# Patient Record
Sex: MALE | Race: BLACK OR AFRICAN AMERICAN | NOT HISPANIC OR LATINO | URBAN - METROPOLITAN AREA
[De-identification: names, ages, dates, MRNs, and addresses within clinical notes are randomized per-mention and may not be internally consistent; named-entity substitution may affect disease eponyms.]

---

## 2017-03-09 ENCOUNTER — EMERGENCY (EMERGENCY)
Facility: HOSPITAL | Age: 72
LOS: 1 days | Discharge: HOME | End: 2017-03-09
Attending: EMERGENCY MEDICINE | Admitting: EMERGENCY MEDICINE
Payer: MEDICARE

## 2017-03-09 VITALS
TEMPERATURE: 98 F | RESPIRATION RATE: 16 BRPM | HEART RATE: 70 BPM | SYSTOLIC BLOOD PRESSURE: 132 MMHG | DIASTOLIC BLOOD PRESSURE: 73 MMHG | OXYGEN SATURATION: 100 %

## 2017-03-09 LAB
ALBUMIN SERPL ELPH-MCNC: 3.7 G/DL — SIGNIFICANT CHANGE UP (ref 3.3–5)
ALP SERPL-CCNC: 135 U/L — HIGH (ref 40–120)
ALT FLD-CCNC: 11 U/L — SIGNIFICANT CHANGE UP (ref 4–41)
AST SERPL-CCNC: 15 U/L — SIGNIFICANT CHANGE UP (ref 4–40)
BASE EXCESS BLDV CALC-SCNC: 7 MMOL/L — SIGNIFICANT CHANGE UP
BASOPHILS # BLD AUTO: 0.02 K/UL — SIGNIFICANT CHANGE UP (ref 0–0.2)
BASOPHILS NFR BLD AUTO: 0.4 % — SIGNIFICANT CHANGE UP (ref 0–2)
BILIRUB SERPL-MCNC: 0.9 MG/DL — SIGNIFICANT CHANGE UP (ref 0.2–1.2)
BLOOD GAS VENOUS - CREATININE: 1.64 MG/DL — HIGH (ref 0.5–1.3)
BUN SERPL-MCNC: 21 MG/DL — SIGNIFICANT CHANGE UP (ref 7–23)
CALCIUM SERPL-MCNC: 9.4 MG/DL — SIGNIFICANT CHANGE UP (ref 8.4–10.5)
CHLORIDE BLDV-SCNC: 98 MMOL/L — SIGNIFICANT CHANGE UP (ref 96–108)
CHLORIDE SERPL-SCNC: 98 MMOL/L — SIGNIFICANT CHANGE UP (ref 98–107)
CK MB BLD-MCNC: 2.53 NG/ML — SIGNIFICANT CHANGE UP (ref 1–6.6)
CK MB BLD-MCNC: 2.61 NG/ML — SIGNIFICANT CHANGE UP (ref 1–6.6)
CK SERPL-CCNC: 105 U/L — SIGNIFICANT CHANGE UP (ref 30–200)
CK SERPL-CCNC: 124 U/L — SIGNIFICANT CHANGE UP (ref 30–200)
CO2 SERPL-SCNC: 28 MMOL/L — SIGNIFICANT CHANGE UP (ref 22–31)
CREAT SERPL-MCNC: 1.63 MG/DL — HIGH (ref 0.5–1.3)
EOSINOPHIL # BLD AUTO: 0.06 K/UL — SIGNIFICANT CHANGE UP (ref 0–0.5)
EOSINOPHIL NFR BLD AUTO: 1.3 % — SIGNIFICANT CHANGE UP (ref 0–6)
GAS PNL BLDV: 136 MMOL/L — SIGNIFICANT CHANGE UP (ref 136–146)
GLUCOSE BLDV-MCNC: 235 — HIGH (ref 70–99)
GLUCOSE SERPL-MCNC: 245 MG/DL — HIGH (ref 70–99)
HBA1C BLD-MCNC: 10 % — HIGH (ref 4–5.6)
HCO3 BLDV-SCNC: 28 MMOL/L — HIGH (ref 20–27)
HCT VFR BLD CALC: 38.9 % — LOW (ref 39–50)
HCT VFR BLDV CALC: 40.7 % — SIGNIFICANT CHANGE UP (ref 39–51)
HGB BLD-MCNC: 12.9 G/DL — LOW (ref 13–17)
HGB BLDV-MCNC: 13.3 G/DL — SIGNIFICANT CHANGE UP (ref 13–17)
IMM GRANULOCYTES NFR BLD AUTO: 0.2 % — SIGNIFICANT CHANGE UP (ref 0–1.5)
LACTATE BLDV-MCNC: 1.5 MMOL/L — SIGNIFICANT CHANGE UP (ref 0.5–2)
LYMPHOCYTES # BLD AUTO: 0.95 K/UL — LOW (ref 1–3.3)
LYMPHOCYTES # BLD AUTO: 20.8 % — SIGNIFICANT CHANGE UP (ref 13–44)
MCHC RBC-ENTMCNC: 29.4 PG — SIGNIFICANT CHANGE UP (ref 27–34)
MCHC RBC-ENTMCNC: 33.2 % — SIGNIFICANT CHANGE UP (ref 32–36)
MCV RBC AUTO: 88.6 FL — SIGNIFICANT CHANGE UP (ref 80–100)
MONOCYTES # BLD AUTO: 0.39 K/UL — SIGNIFICANT CHANGE UP (ref 0–0.9)
MONOCYTES NFR BLD AUTO: 8.5 % — SIGNIFICANT CHANGE UP (ref 2–14)
NEUTROPHILS # BLD AUTO: 3.14 K/UL — SIGNIFICANT CHANGE UP (ref 1.8–7.4)
NEUTROPHILS NFR BLD AUTO: 68.8 % — SIGNIFICANT CHANGE UP (ref 43–77)
PCO2 BLDV: 58 MMHG — HIGH (ref 41–51)
PH BLDV: 7.36 PH — SIGNIFICANT CHANGE UP (ref 7.32–7.43)
PLATELET # BLD AUTO: 174 K/UL — SIGNIFICANT CHANGE UP (ref 150–400)
PMV BLD: 10.2 FL — SIGNIFICANT CHANGE UP (ref 7–13)
PO2 BLDV: 25 MMHG — LOW (ref 35–40)
POTASSIUM BLDV-SCNC: 4.5 MMOL/L — SIGNIFICANT CHANGE UP (ref 3.4–4.5)
POTASSIUM SERPL-MCNC: 4.5 MMOL/L — SIGNIFICANT CHANGE UP (ref 3.5–5.3)
POTASSIUM SERPL-SCNC: 4.5 MMOL/L — SIGNIFICANT CHANGE UP (ref 3.5–5.3)
PROT SERPL-MCNC: 6.8 G/DL — SIGNIFICANT CHANGE UP (ref 6–8.3)
RBC # BLD: 4.39 M/UL — SIGNIFICANT CHANGE UP (ref 4.2–5.8)
RBC # FLD: 12.6 % — SIGNIFICANT CHANGE UP (ref 10.3–14.5)
SAO2 % BLDV: 35.6 % — LOW (ref 60–85)
SODIUM SERPL-SCNC: 139 MMOL/L — SIGNIFICANT CHANGE UP (ref 135–145)
TROPONIN T SERPL-MCNC: < 0.06 NG/ML — SIGNIFICANT CHANGE UP (ref 0–0.06)
TROPONIN T SERPL-MCNC: < 0.06 NG/ML — SIGNIFICANT CHANGE UP (ref 0–0.06)
WBC # BLD: 4.57 K/UL — SIGNIFICANT CHANGE UP (ref 3.8–10.5)
WBC # FLD AUTO: 4.57 K/UL — SIGNIFICANT CHANGE UP (ref 3.8–10.5)

## 2017-03-09 PROCEDURE — 71020: CPT | Mod: 26

## 2017-03-09 PROCEDURE — 99218: CPT | Mod: GC

## 2017-03-09 RX ORDER — DEXTROSE 50 % IN WATER 50 %
25 SYRINGE (ML) INTRAVENOUS ONCE
Qty: 0 | Refills: 0 | Status: DISCONTINUED | OUTPATIENT
Start: 2017-03-09 | End: 2017-03-09

## 2017-03-09 RX ORDER — GLUCAGON INJECTION, SOLUTION 0.5 MG/.1ML
1 INJECTION, SOLUTION SUBCUTANEOUS ONCE
Qty: 0 | Refills: 0 | Status: DISCONTINUED | OUTPATIENT
Start: 2017-03-09 | End: 2017-03-13

## 2017-03-09 RX ORDER — DEXTROSE 50 % IN WATER 50 %
12.5 SYRINGE (ML) INTRAVENOUS ONCE
Qty: 0 | Refills: 0 | Status: DISCONTINUED | OUTPATIENT
Start: 2017-03-09 | End: 2017-03-09

## 2017-03-09 RX ORDER — GLUCAGON INJECTION, SOLUTION 0.5 MG/.1ML
1 INJECTION, SOLUTION SUBCUTANEOUS ONCE
Qty: 0 | Refills: 0 | Status: DISCONTINUED | OUTPATIENT
Start: 2017-03-09 | End: 2017-03-09

## 2017-03-09 RX ORDER — DEXTROSE 50 % IN WATER 50 %
25 SYRINGE (ML) INTRAVENOUS ONCE
Qty: 0 | Refills: 0 | Status: DISCONTINUED | OUTPATIENT
Start: 2017-03-09 | End: 2017-03-13

## 2017-03-09 RX ORDER — CARVEDILOL PHOSPHATE 80 MG/1
25 CAPSULE, EXTENDED RELEASE ORAL EVERY 12 HOURS
Qty: 0 | Refills: 0 | Status: DISCONTINUED | OUTPATIENT
Start: 2017-03-09 | End: 2017-03-13

## 2017-03-09 RX ORDER — DEXTROSE 50 % IN WATER 50 %
1 SYRINGE (ML) INTRAVENOUS ONCE
Qty: 0 | Refills: 0 | Status: DISCONTINUED | OUTPATIENT
Start: 2017-03-09 | End: 2017-03-09

## 2017-03-09 RX ORDER — SODIUM CHLORIDE 9 MG/ML
1000 INJECTION, SOLUTION INTRAVENOUS
Qty: 0 | Refills: 0 | Status: DISCONTINUED | OUTPATIENT
Start: 2017-03-09 | End: 2017-03-09

## 2017-03-09 RX ORDER — INSULIN LISPRO 100/ML
VIAL (ML) SUBCUTANEOUS
Qty: 0 | Refills: 0 | Status: DISCONTINUED | OUTPATIENT
Start: 2017-03-09 | End: 2017-03-13

## 2017-03-09 RX ORDER — TAMSULOSIN HYDROCHLORIDE 0.4 MG/1
0.4 CAPSULE ORAL AT BEDTIME
Qty: 0 | Refills: 0 | Status: DISCONTINUED | OUTPATIENT
Start: 2017-03-09 | End: 2017-03-13

## 2017-03-09 RX ORDER — INSULIN GLARGINE 100 [IU]/ML
10 INJECTION, SOLUTION SUBCUTANEOUS AT BEDTIME
Qty: 0 | Refills: 0 | Status: DISCONTINUED | OUTPATIENT
Start: 2017-03-09 | End: 2017-03-13

## 2017-03-09 RX ORDER — SODIUM CHLORIDE 9 MG/ML
1000 INJECTION INTRAMUSCULAR; INTRAVENOUS; SUBCUTANEOUS ONCE
Qty: 0 | Refills: 0 | Status: COMPLETED | OUTPATIENT
Start: 2017-03-09 | End: 2017-03-09

## 2017-03-09 RX ORDER — SODIUM CHLORIDE 9 MG/ML
1000 INJECTION, SOLUTION INTRAVENOUS
Qty: 0 | Refills: 0 | Status: DISCONTINUED | OUTPATIENT
Start: 2017-03-09 | End: 2017-03-13

## 2017-03-09 RX ORDER — INSULIN LISPRO 100/ML
VIAL (ML) SUBCUTANEOUS
Qty: 0 | Refills: 0 | Status: DISCONTINUED | OUTPATIENT
Start: 2017-03-09 | End: 2017-03-09

## 2017-03-09 RX ORDER — INSULIN LISPRO 100/ML
3 VIAL (ML) SUBCUTANEOUS
Qty: 0 | Refills: 0 | Status: DISCONTINUED | OUTPATIENT
Start: 2017-03-09 | End: 2017-03-13

## 2017-03-09 RX ORDER — DEXTROSE 50 % IN WATER 50 %
1 SYRINGE (ML) INTRAVENOUS ONCE
Qty: 0 | Refills: 0 | Status: DISCONTINUED | OUTPATIENT
Start: 2017-03-09 | End: 2017-03-13

## 2017-03-09 RX ORDER — DEXTROSE 50 % IN WATER 50 %
12.5 SYRINGE (ML) INTRAVENOUS ONCE
Qty: 0 | Refills: 0 | Status: DISCONTINUED | OUTPATIENT
Start: 2017-03-09 | End: 2017-03-13

## 2017-03-09 RX ADMIN — SODIUM CHLORIDE 1000 MILLILITER(S): 9 INJECTION INTRAMUSCULAR; INTRAVENOUS; SUBCUTANEOUS at 12:46

## 2017-03-09 RX ADMIN — Medication 1: at 18:34

## 2017-03-09 RX ADMIN — TAMSULOSIN HYDROCHLORIDE 0.4 MILLIGRAM(S): 0.4 CAPSULE ORAL at 21:20

## 2017-03-09 RX ADMIN — CARVEDILOL PHOSPHATE 25 MILLIGRAM(S): 80 CAPSULE, EXTENDED RELEASE ORAL at 19:00

## 2017-03-09 NOTE — ED CDU PROVIDER NOTE - DETAILS
71 y.o male pmhx DM, HTN, BPH w/ L sided intermittent chest pain. Cardiac enzymes, stress test, cardiac monitor.

## 2017-03-09 NOTE — ED ADULT NURSE NOTE - OBJECTIVE STATEMENT
Received patient to room 11 with c/o left shoulder and chest pain that he has had for 3 weeks but worse since last night and not relieved with movement. Patient evaluated by MD. IVL placed to left AC 20 gauge and labs drawn and sent, Patient placed on cardiac monitor and is NSR. Pt does c/o 6/10 chest and shoulder pain..more the shoulder than chest. EKG done. Waiting for results, further testing, evaluation and disposition. Will continue to monitor.    KWADWO Macias

## 2017-03-09 NOTE — ED CDU PROVIDER NOTE - PROGRESS NOTE DETAILS
YVONNE Ballard: Left message with  at Dr. Kaba office for call back. Pt comfortable no complaints at the moment. YVONNE Ballard: Pts a1c 10. will call endo consult. YVONNE Maynard: Pt reassessed at bedside, NAD, VSS, NSR on tele, CEx2 negative, pending stress test in the AM. Spoke to endo fellow Dr. Patel, recommends Humalog 3U TID w/meals, Lantus 10U qhs, and low dose sliding scale. Pt refusing Lantus dose. Told him that lantus is slow releasing and should not cause hypoglycemia, however pt still does not want lantus. Will continue to monitor. YVONNE Maynard: Pt started c/o left sided chest pain, no shortness of breath, no radiation of cp. Repeat EKG done, unchanged. Cardiac enzymes resent. Will give Morphine 4mg now. Dr. Nguyen: I performed a face to face bedside interview with patient regarding history of present illness, review of symptoms and past medical history. I completed an independent physical exam.  I have discussed patient's plan of care with PA.   I agree with note as stated above, having amended the EMR as needed to reflect my findings.   This includes HISTORY OF PRESENT ILLNESS, HIV, PAST MEDICAL/SURGICAL/FAMILY/SOCIAL HISTORY, ALLERGIES AND HOME MEDICATIONS, REVIEW OF SYSTEMS, PHYSICAL EXAM, and any PROGRESS NOTES during the time I functioned as the attending physician for this patient. Dr. Nguyen: Pt in stress test. Dr. Nguyen: Pt returned from stress, doing well, asx, eating breakfast. Awaiting stress results and endocrine. PA Gerasimou - Pt and vitals stable. Pt doing well in the morning, denies CP, SOB, palpitations, abdominal pain. Pt admits to an "upset stomach", after having breakfast. Denies any pain, +bloating. Exam: +BSx4 quadrants. Soft, non-tender. No rebound tenderness or guarding. Plan: Pepcid, continue to reassess. YVONNE Carter - Spoke with merissa Little. Pt states he would like to change his diet and continue his insulin regime at home, will f/u with PCP for repeat A1c. Merissa recommends to continue home regime Humilin 70/30, 20 units before breakfast and 6 units before dinner with vial. YVONNE Carter - Spoke with merissa Little. Pt states he would like to change his diet and continue his insulin regime at home, will f/u with PCP for repeat A1c. Merissa recommends to continue home regime Humilin 70/30, 20 units before breakfast and 6 units before dinner with vial. Pt stable, denies chest pain, SOB or any comaplints. Dr. Nguyen is amenable for pt discharge home. Dr. Nguyen: Seen by poppy, recommendations give, pt stable for dc.

## 2017-03-09 NOTE — ED PROVIDER NOTE - OBJECTIVE STATEMENT
71M h/o DM,HTN, BPH p/w left sided chest pain.  Pain began last night, was severe, sternal radiating to the left shoulder.  Pain is sharp, constant, unable to sleep 2/2 pain.  Unknown if it is exertional as he did not walk much 2/2 pain.  Denies SOB, abd pain, n/v/d/diaphoresis.  Denies cough or fever. 71M h/o DM,HTN, BPH p/w left sided chest pain.  Pain began last night, was severe, sternal radiating to the left shoulder.  Pain is sharp, constant, unable to sleep 2/2 pain.  Unknown if it is exertional as he did not walk much 2/2 pain.  Denies SOB, abd pain, n/v/d/diaphoresis.  Denies cough or fever.  Pt given ASA 162mg by EMS 71M h/o DM,HTN, BPH p/w left sided chest pain.  Pain began last night, was severe, sternal radiating to the left shoulder.  Pain is sharp, constant, unable to sleep 2/2 pain.  Unknown if it is exertional as he did not walk much 2/2 pain.  Denies SOB, abd pain, n/v/d/diaphoresis.  Denies cough or fever.  Pt given ASA 162mg by EMS    Attending/Stella: 70 yo M as described above, p/w chest pain. Pt reports around midnight last night of a sudden onset of left-sided chest pain described as pressure-like, initially 8/10, non-radiating, associated w/ SALEEM. Maninder n/v, diaphoresis, or palp. Pt also notes increase of chest pain w/ exertion.

## 2017-03-09 NOTE — ED PROVIDER NOTE - PROGRESS NOTE DETAILS
no acute findings on initial testing, given age and comorbidities will send to obs for further testing

## 2017-03-09 NOTE — ED ADULT TRIAGE NOTE - CHIEF COMPLAINT QUOTE
Pt c/o intermittent chest pressure radiating L shldr  x 3 weeks , worse last night.  Denies sob, dizziness, cough.  Asp 2 tabs given by ems

## 2017-03-09 NOTE — ED CDU PROVIDER NOTE - PLAN OF CARE
Follow up with cardiology, referral list given. You may take Aspirin over the counter 81mg until you are evaluated by cardiology. Follow up with your primary care physician regarding your Diabetes for repeat A1c testing. Return to ER for any chest pain, shortness of breathe, palpitations or any other concerns.

## 2017-03-09 NOTE — ED PROVIDER NOTE - PHYSICAL EXAMINATION
Attending/Stella: NAD, PERRL/EOMI, supple, no carotid bruit, RRR, Abd-sfot, NT/ND, no LE edema, +2 Dp/PT, A&ox3, nonfocal; Skin-warm/dry

## 2017-03-10 VITALS
DIASTOLIC BLOOD PRESSURE: 86 MMHG | TEMPERATURE: 98 F | SYSTOLIC BLOOD PRESSURE: 172 MMHG | HEART RATE: 57 BPM | RESPIRATION RATE: 16 BRPM | OXYGEN SATURATION: 100 %

## 2017-03-10 LAB
CK MB BLD-MCNC: 2.98 NG/ML — SIGNIFICANT CHANGE UP (ref 1–6.6)
CK MB BLD-MCNC: SIGNIFICANT CHANGE UP (ref 0–2.5)
CK SERPL-CCNC: 120 U/L — SIGNIFICANT CHANGE UP (ref 30–200)
TROPONIN T SERPL-MCNC: < 0.06 NG/ML — SIGNIFICANT CHANGE UP (ref 0–0.06)

## 2017-03-10 PROCEDURE — 99217: CPT | Mod: GC

## 2017-03-10 PROCEDURE — 93018 CV STRESS TEST I&R ONLY: CPT | Mod: GC

## 2017-03-10 PROCEDURE — 78452 HT MUSCLE IMAGE SPECT MULT: CPT | Mod: 26

## 2017-03-10 PROCEDURE — 99284 EMERGENCY DEPT VISIT MOD MDM: CPT

## 2017-03-10 PROCEDURE — 93016 CV STRESS TEST SUPVJ ONLY: CPT | Mod: GC

## 2017-03-10 RX ORDER — FAMOTIDINE 10 MG/ML
20 INJECTION INTRAVENOUS ONCE
Qty: 0 | Refills: 0 | Status: COMPLETED | OUTPATIENT
Start: 2017-03-10 | End: 2017-03-10

## 2017-03-10 RX ORDER — MORPHINE SULFATE 50 MG/1
4 CAPSULE, EXTENDED RELEASE ORAL ONCE
Qty: 0 | Refills: 0 | Status: DISCONTINUED | OUTPATIENT
Start: 2017-03-10 | End: 2017-03-10

## 2017-03-10 RX ADMIN — Medication: at 10:25

## 2017-03-10 RX ADMIN — FAMOTIDINE 20 MILLIGRAM(S): 10 INJECTION INTRAVENOUS at 13:19

## 2017-03-10 RX ADMIN — MORPHINE SULFATE 4 MILLIGRAM(S): 50 CAPSULE, EXTENDED RELEASE ORAL at 07:05

## 2017-03-10 RX ADMIN — MORPHINE SULFATE 4 MILLIGRAM(S): 50 CAPSULE, EXTENDED RELEASE ORAL at 06:48

## 2017-03-10 RX ADMIN — Medication 3 UNIT(S): at 15:28

## 2017-03-10 RX ADMIN — Medication 3 UNIT(S): at 10:25

## 2017-03-10 RX ADMIN — Medication 2: at 15:28

## 2017-04-19 ENCOUNTER — EMERGENCY (EMERGENCY)
Facility: HOSPITAL | Age: 72
LOS: 1 days | Discharge: HOME | End: 2017-04-19
Attending: EMERGENCY MEDICINE | Admitting: EMERGENCY MEDICINE
Payer: MEDICARE

## 2017-04-19 VITALS
TEMPERATURE: 99 F | OXYGEN SATURATION: 100 % | SYSTOLIC BLOOD PRESSURE: 135 MMHG | HEART RATE: 75 BPM | RESPIRATION RATE: 18 BRPM | DIASTOLIC BLOOD PRESSURE: 89 MMHG

## 2017-04-19 PROCEDURE — 99284 EMERGENCY DEPT VISIT MOD MDM: CPT | Mod: 25

## 2017-04-19 NOTE — ED ADULT TRIAGE NOTE - CHIEF COMPLAINT QUOTE
Pt c/o abdominal pain x1 month. Pt c/o burning sensation in throat. Pt states he was prescribed omeprazole from his PMD but no relief. Denies n/v/d.

## 2017-04-20 VITALS
DIASTOLIC BLOOD PRESSURE: 91 MMHG | HEART RATE: 64 BPM | SYSTOLIC BLOOD PRESSURE: 198 MMHG | OXYGEN SATURATION: 100 % | RESPIRATION RATE: 16 BRPM

## 2017-04-20 LAB
ALBUMIN SERPL ELPH-MCNC: 4.2 G/DL — SIGNIFICANT CHANGE UP (ref 3.3–5)
ALP SERPL-CCNC: 101 U/L — SIGNIFICANT CHANGE UP (ref 40–120)
ALT FLD-CCNC: 10 U/L — SIGNIFICANT CHANGE UP (ref 4–41)
AST SERPL-CCNC: 18 U/L — SIGNIFICANT CHANGE UP (ref 4–40)
BILIRUB SERPL-MCNC: 1 MG/DL — SIGNIFICANT CHANGE UP (ref 0.2–1.2)
BUN SERPL-MCNC: 30 MG/DL — HIGH (ref 7–23)
CALCIUM SERPL-MCNC: 9.5 MG/DL — SIGNIFICANT CHANGE UP (ref 8.4–10.5)
CHLORIDE SERPL-SCNC: 95 MMOL/L — LOW (ref 98–107)
CO2 SERPL-SCNC: 27 MMOL/L — SIGNIFICANT CHANGE UP (ref 22–31)
CREAT SERPL-MCNC: 1.82 MG/DL — HIGH (ref 0.5–1.3)
GLUCOSE SERPL-MCNC: 233 MG/DL — HIGH (ref 70–99)
HCT VFR BLD CALC: 39.2 % — SIGNIFICANT CHANGE UP (ref 39–50)
HGB BLD-MCNC: 12.9 G/DL — LOW (ref 13–17)
LIDOCAIN IGE QN: 22.9 U/L — SIGNIFICANT CHANGE UP (ref 7–60)
MCHC RBC-ENTMCNC: 29.5 PG — SIGNIFICANT CHANGE UP (ref 27–34)
MCHC RBC-ENTMCNC: 32.9 % — SIGNIFICANT CHANGE UP (ref 32–36)
MCV RBC AUTO: 89.5 FL — SIGNIFICANT CHANGE UP (ref 80–100)
PLATELET # BLD AUTO: 187 K/UL — SIGNIFICANT CHANGE UP (ref 150–400)
PMV BLD: 10.3 FL — SIGNIFICANT CHANGE UP (ref 7–13)
POTASSIUM SERPL-MCNC: 4.2 MMOL/L — SIGNIFICANT CHANGE UP (ref 3.5–5.3)
POTASSIUM SERPL-SCNC: 4.2 MMOL/L — SIGNIFICANT CHANGE UP (ref 3.5–5.3)
PROT SERPL-MCNC: 7.2 G/DL — SIGNIFICANT CHANGE UP (ref 6–8.3)
RBC # BLD: 4.38 M/UL — SIGNIFICANT CHANGE UP (ref 4.2–5.8)
RBC # FLD: 12.8 % — SIGNIFICANT CHANGE UP (ref 10.3–14.5)
SODIUM SERPL-SCNC: 135 MMOL/L — SIGNIFICANT CHANGE UP (ref 135–145)
WBC # BLD: 3.85 K/UL — SIGNIFICANT CHANGE UP (ref 3.8–10.5)
WBC # FLD AUTO: 3.85 K/UL — SIGNIFICANT CHANGE UP (ref 3.8–10.5)

## 2017-04-20 RX ORDER — SODIUM CHLORIDE 9 MG/ML
1000 INJECTION INTRAMUSCULAR; INTRAVENOUS; SUBCUTANEOUS ONCE
Qty: 0 | Refills: 0 | Status: COMPLETED | OUTPATIENT
Start: 2017-04-20 | End: 2017-04-20

## 2017-04-20 RX ORDER — FAMOTIDINE 10 MG/ML
20 INJECTION INTRAVENOUS ONCE
Qty: 0 | Refills: 0 | Status: COMPLETED | OUTPATIENT
Start: 2017-04-20 | End: 2017-04-20

## 2017-04-20 RX ADMIN — Medication 30 MILLILITER(S): at 00:36

## 2017-04-20 RX ADMIN — FAMOTIDINE 20 MILLIGRAM(S): 10 INJECTION INTRAVENOUS at 00:36

## 2017-04-20 RX ADMIN — SODIUM CHLORIDE 1000 MILLILITER(S): 9 INJECTION INTRAMUSCULAR; INTRAVENOUS; SUBCUTANEOUS at 00:36

## 2017-04-20 NOTE — ED PROVIDER NOTE - MEDICAL DECISION MAKING DETAILS
non focal exam; well appearing; requesting endoscopy; informed will give GI follow up where he can get endoscopy; symptoms most consistent with GERD

## 2017-04-20 NOTE — ED ADULT NURSE NOTE - OBJECTIVE STATEMENT
Pt received on stretcher in room 10. Pt is awake, ambulatory, A&Ox4, NAD observed, respirations even and unlabored on room air. Pt is 73 YO male with h/o HTN, DM c/o abd pain x 1 month. Pain is in epigastric area, rated 4/10 on pain scale, does not radiate, positional relief. Abdomen appears nondistended, nontender on palpation, BS present in all four abd quadrants. Last BM this morning. Denies HA, CP, SOB, nausea, vomiting, diarrhea, urinary symptoms. VS recorded. 20G IV access obtained in right AC, labs drawn and sent as ordered. Medications administered as ordered. Comfort and safety measures provided. Call bell within reach.

## 2017-04-20 NOTE — ED PROVIDER NOTE - PROGRESS NOTE DETAILS
feels better; normal abdominal exam; pt does not endorse pain currently; EKG wnl; had cardiac workup on prior CDU stay which was wnl.  Will d/c home with GI follow up

## 2017-04-20 NOTE — ED PROVIDER NOTE - OBJECTIVE STATEMENT
72 yr old male with PMH of HTN, DM presents with 1 month of mild abdominal pain.  States feels burning in epigastrum radiating to throat with belching.  Happens intermittently.  Went to see PCP and was prescribed omeprazole with minimal improvement.  Denies, fever, vomiting.  Denies CP, SOB.  No prior abdominal surgeries.  Has been having BM and passing flatus.

## 2017-04-25 ENCOUNTER — APPOINTMENT (OUTPATIENT)
Dept: GASTROENTEROLOGY | Facility: CLINIC | Age: 72
End: 2017-04-25

## 2017-04-25 VITALS
HEART RATE: 68 BPM | HEIGHT: 72 IN | SYSTOLIC BLOOD PRESSURE: 130 MMHG | DIASTOLIC BLOOD PRESSURE: 70 MMHG | TEMPERATURE: 98.6 F | WEIGHT: 174 LBS | BODY MASS INDEX: 23.57 KG/M2 | OXYGEN SATURATION: 99 %

## 2017-04-25 DIAGNOSIS — Z78.9 OTHER SPECIFIED HEALTH STATUS: ICD-10-CM

## 2017-04-25 DIAGNOSIS — F15.90 OTHER STIMULANT USE, UNSPECIFIED, UNCOMPLICATED: ICD-10-CM

## 2017-04-25 DIAGNOSIS — K31.9 DISEASE OF STOMACH AND DUODENUM, UNSPECIFIED: ICD-10-CM

## 2017-04-25 DIAGNOSIS — N40.0 BENIGN PROSTATIC HYPERPLASIA WITHOUT LOWER URINARY TRACT SYMPMS: ICD-10-CM

## 2017-04-25 DIAGNOSIS — Z82.3 FAMILY HISTORY OF STROKE: ICD-10-CM

## 2017-04-25 RX ORDER — INSULIN HUMAN 100 [IU]/ML
(70-30) 100 INJECTION, SUSPENSION SUBCUTANEOUS
Qty: 30 | Refills: 0 | Status: ACTIVE | COMMUNITY
Start: 2017-02-16

## 2017-04-25 RX ORDER — SYRINGE AND NEEDLE,INSULIN,1ML 30 G X1/2"
30G X 1/2" SYRINGE, EMPTY DISPOSABLE MISCELLANEOUS
Qty: 100 | Refills: 0 | Status: ACTIVE | COMMUNITY
Start: 2017-01-04

## 2017-04-25 RX ORDER — IRON FUM,AG/C/B12/FOLIC/CA/SUC 151-60-1MG
50-101-1 TABLET ORAL
Qty: 30 | Refills: 0 | Status: ACTIVE | COMMUNITY
Start: 2016-05-05

## 2017-04-25 RX ORDER — LATANOPROST/PF 0.005 %
0.01 DROPS OPHTHALMIC (EYE)
Qty: 7 | Refills: 0 | Status: ACTIVE | COMMUNITY
Start: 2016-09-26

## 2017-04-25 RX ORDER — BLOOD SUGAR DIAGNOSTIC
STRIP MISCELLANEOUS
Qty: 100 | Refills: 0 | Status: ACTIVE | COMMUNITY
Start: 2016-02-12

## 2017-04-25 RX ORDER — CARVEDILOL 25 MG/1
25 TABLET, FILM COATED ORAL
Qty: 60 | Refills: 0 | Status: ACTIVE | COMMUNITY
Start: 2016-09-29

## 2017-04-25 RX ORDER — BRIMONIDINE TARTRATE 1.5 MG/ML
0.15 SOLUTION/ DROPS OPHTHALMIC
Qty: 5 | Refills: 0 | Status: ACTIVE | COMMUNITY
Start: 2016-09-26

## 2017-04-25 RX ORDER — TAMSULOSIN HYDROCHLORIDE 0.4 MG/1
0.4 CAPSULE ORAL
Qty: 30 | Refills: 0 | Status: ACTIVE | COMMUNITY
Start: 2016-08-01

## 2017-06-28 ENCOUNTER — APPOINTMENT (OUTPATIENT)
Dept: GASTROENTEROLOGY | Facility: AMBULATORY MEDICAL SERVICES | Age: 72
End: 2017-06-28

## 2017-07-18 ENCOUNTER — LABORATORY RESULT (OUTPATIENT)
Age: 72
End: 2017-07-18

## 2017-07-18 ENCOUNTER — APPOINTMENT (OUTPATIENT)
Dept: GASTROENTEROLOGY | Facility: CLINIC | Age: 72
End: 2017-07-18

## 2017-07-18 VITALS
SYSTOLIC BLOOD PRESSURE: 110 MMHG | DIASTOLIC BLOOD PRESSURE: 70 MMHG | BODY MASS INDEX: 20.59 KG/M2 | HEIGHT: 72 IN | WEIGHT: 152 LBS | OXYGEN SATURATION: 99 % | TEMPERATURE: 98.7 F | HEART RATE: 82 BPM

## 2017-07-18 DIAGNOSIS — N28.9 DISORDER OF KIDNEY AND URETER, UNSPECIFIED: ICD-10-CM

## 2017-07-18 RX ORDER — OMEPRAZOLE 40 MG/1
40 CAPSULE, DELAYED RELEASE ORAL
Qty: 30 | Refills: 3 | Status: DISCONTINUED | COMMUNITY
Start: 2017-03-17 | End: 2017-07-18

## 2017-07-26 ENCOUNTER — APPOINTMENT (OUTPATIENT)
Dept: ULTRASOUND IMAGING | Facility: CLINIC | Age: 72
End: 2017-07-26

## 2017-08-08 ENCOUNTER — FORM ENCOUNTER (OUTPATIENT)
Age: 72
End: 2017-08-08

## 2017-08-09 ENCOUNTER — OUTPATIENT (OUTPATIENT)
Dept: OUTPATIENT SERVICES | Facility: HOSPITAL | Age: 72
LOS: 1 days | End: 2017-08-09
Payer: COMMERCIAL

## 2017-08-09 ENCOUNTER — APPOINTMENT (OUTPATIENT)
Dept: ULTRASOUND IMAGING | Facility: CLINIC | Age: 72
End: 2017-08-09
Payer: MEDICARE

## 2017-08-09 DIAGNOSIS — D18.03 HEMANGIOMA OF INTRA-ABDOMINAL STRUCTURES: ICD-10-CM

## 2017-08-09 PROCEDURE — 76700 US EXAM ABDOM COMPLETE: CPT

## 2017-08-09 PROCEDURE — 76700 US EXAM ABDOM COMPLETE: CPT | Mod: 26

## 2017-08-14 ENCOUNTER — LABORATORY RESULT (OUTPATIENT)
Age: 72
End: 2017-08-14

## 2017-08-18 ENCOUNTER — APPOINTMENT (OUTPATIENT)
Dept: GASTROENTEROLOGY | Facility: CLINIC | Age: 72
End: 2017-08-18
Payer: MEDICARE

## 2017-08-18 VITALS
BODY MASS INDEX: 20.05 KG/M2 | HEIGHT: 72 IN | SYSTOLIC BLOOD PRESSURE: 110 MMHG | HEART RATE: 82 BPM | OXYGEN SATURATION: 99 % | WEIGHT: 148 LBS | TEMPERATURE: 98 F | DIASTOLIC BLOOD PRESSURE: 70 MMHG

## 2017-08-18 DIAGNOSIS — R63.4 ABNORMAL WEIGHT LOSS: ICD-10-CM

## 2017-08-18 PROCEDURE — 99213 OFFICE O/P EST LOW 20 MIN: CPT

## 2017-08-18 RX ORDER — DICLOFENAC SODIUM 10 MG/G
1 GEL TOPICAL
Qty: 100 | Refills: 0 | Status: ACTIVE | COMMUNITY
Start: 2017-07-14

## 2017-08-18 RX ORDER — NEOMYCIN SULFATE, POLYMYXIN B SULFATE AND DEXAMETHASONE 3.5; 10000; 1 MG/ML; [USP'U]/ML; MG/ML
3.5-10000-0.1 SUSPENSION OPHTHALMIC
Qty: 5 | Refills: 0 | Status: ACTIVE | COMMUNITY
Start: 2017-07-12

## 2017-09-28 ENCOUNTER — APPOINTMENT (OUTPATIENT)
Dept: GASTROENTEROLOGY | Facility: CLINIC | Age: 72
End: 2017-09-28
Payer: MEDICARE

## 2017-09-28 VITALS
WEIGHT: 148 LBS | DIASTOLIC BLOOD PRESSURE: 86 MMHG | HEIGHT: 72 IN | SYSTOLIC BLOOD PRESSURE: 140 MMHG | HEART RATE: 73 BPM | OXYGEN SATURATION: 97 % | BODY MASS INDEX: 20.05 KG/M2 | TEMPERATURE: 98.6 F

## 2017-09-28 DIAGNOSIS — R63.0 ANOREXIA: ICD-10-CM

## 2017-09-28 PROCEDURE — 99213 OFFICE O/P EST LOW 20 MIN: CPT

## 2017-10-17 ENCOUNTER — APPOINTMENT (OUTPATIENT)
Dept: GASTROENTEROLOGY | Facility: CLINIC | Age: 72
End: 2017-10-17

## 2017-10-31 ENCOUNTER — APPOINTMENT (OUTPATIENT)
Dept: GASTROENTEROLOGY | Facility: CLINIC | Age: 72
End: 2017-10-31
Payer: MEDICARE

## 2017-10-31 VITALS
BODY MASS INDEX: 21.13 KG/M2 | TEMPERATURE: 98 F | HEART RATE: 59 BPM | DIASTOLIC BLOOD PRESSURE: 80 MMHG | WEIGHT: 156 LBS | OXYGEN SATURATION: 99 % | HEIGHT: 72 IN | SYSTOLIC BLOOD PRESSURE: 120 MMHG

## 2017-10-31 DIAGNOSIS — E11.9 TYPE 2 DIABETES MELLITUS W/OUT COMPLICATIONS: ICD-10-CM

## 2017-10-31 DIAGNOSIS — R10.13 EPIGASTRIC PAIN: ICD-10-CM

## 2017-10-31 DIAGNOSIS — Z12.11 ENCOUNTER FOR SCREENING FOR MALIGNANT NEOPLASM OF COLON: ICD-10-CM

## 2017-10-31 DIAGNOSIS — K29.00 ACUTE GASTRITIS W/OUT BLEEDING: ICD-10-CM

## 2017-10-31 PROCEDURE — 99213 OFFICE O/P EST LOW 20 MIN: CPT

## 2017-10-31 RX ORDER — TROSPIUM CHLORIDE 20 MG/1
20 TABLET, FILM COATED ORAL
Qty: 30 | Refills: 0 | Status: DISCONTINUED | COMMUNITY
Start: 2017-08-07 | End: 2017-10-31

## 2017-10-31 RX ORDER — MIRABEGRON 25 MG/1
25 TABLET, FILM COATED, EXTENDED RELEASE ORAL DAILY
Qty: 30 | Refills: 0 | Status: ACTIVE | COMMUNITY
Start: 2017-10-31

## 2017-10-31 RX ORDER — LORAZEPAM 0.5 MG/1
0.5 TABLET ORAL
Qty: 60 | Refills: 0 | Status: DISCONTINUED | COMMUNITY
Start: 2017-03-17 | End: 2017-10-31

## 2017-10-31 RX ORDER — RAMELTEON 8 MG/1
8 TABLET, FILM COATED ORAL
Qty: 14 | Refills: 0 | Status: DISCONTINUED | COMMUNITY
Start: 2017-08-07 | End: 2017-10-31

## 2017-11-06 ENCOUNTER — EMERGENCY (EMERGENCY)
Facility: HOSPITAL | Age: 72
LOS: 1 days | Discharge: HOME | End: 2017-11-06
Attending: EMERGENCY MEDICINE | Admitting: EMERGENCY MEDICINE
Payer: COMMERCIAL

## 2017-11-06 VITALS
TEMPERATURE: 98 F | RESPIRATION RATE: 18 BRPM | OXYGEN SATURATION: 100 % | SYSTOLIC BLOOD PRESSURE: 181 MMHG | HEART RATE: 65 BPM | DIASTOLIC BLOOD PRESSURE: 93 MMHG

## 2017-11-06 VITALS
RESPIRATION RATE: 18 BRPM | HEART RATE: 60 BPM | SYSTOLIC BLOOD PRESSURE: 175 MMHG | OXYGEN SATURATION: 98 % | DIASTOLIC BLOOD PRESSURE: 100 MMHG | TEMPERATURE: 98 F

## 2017-11-06 LAB
ALBUMIN SERPL ELPH-MCNC: 4.2 G/DL — SIGNIFICANT CHANGE UP (ref 3.3–5)
ALP SERPL-CCNC: 108 U/L — SIGNIFICANT CHANGE UP (ref 40–120)
ALT FLD-CCNC: 20 U/L RC — SIGNIFICANT CHANGE UP (ref 10–45)
ANION GAP SERPL CALC-SCNC: 10 MMOL/L — SIGNIFICANT CHANGE UP (ref 5–17)
AST SERPL-CCNC: 26 U/L — SIGNIFICANT CHANGE UP (ref 10–40)
BASE EXCESS BLDV CALC-SCNC: 5.6 MMOL/L — HIGH (ref -2–2)
BASOPHILS # BLD AUTO: 0.1 K/UL — SIGNIFICANT CHANGE UP (ref 0–0.2)
BASOPHILS NFR BLD AUTO: 1.8 % — SIGNIFICANT CHANGE UP (ref 0–2)
BILIRUB SERPL-MCNC: 0.5 MG/DL — SIGNIFICANT CHANGE UP (ref 0.2–1.2)
BUN SERPL-MCNC: 26 MG/DL — HIGH (ref 7–23)
CA-I SERPL-SCNC: 1.22 MMOL/L — SIGNIFICANT CHANGE UP (ref 1.12–1.3)
CALCIUM SERPL-MCNC: 9.4 MG/DL — SIGNIFICANT CHANGE UP (ref 8.4–10.5)
CHLORIDE BLDV-SCNC: 100 MMOL/L — SIGNIFICANT CHANGE UP (ref 96–108)
CHLORIDE SERPL-SCNC: 99 MMOL/L — SIGNIFICANT CHANGE UP (ref 96–108)
CO2 BLDV-SCNC: 34 MMOL/L — HIGH (ref 22–30)
CO2 SERPL-SCNC: 28 MMOL/L — SIGNIFICANT CHANGE UP (ref 22–31)
CREAT SERPL-MCNC: 1.73 MG/DL — HIGH (ref 0.5–1.3)
EOSINOPHIL # BLD AUTO: 0.2 K/UL — SIGNIFICANT CHANGE UP (ref 0–0.5)
EOSINOPHIL NFR BLD AUTO: 5.6 % — SIGNIFICANT CHANGE UP (ref 0–6)
GAS PNL BLDV: 135 MMOL/L — LOW (ref 136–145)
GAS PNL BLDV: SIGNIFICANT CHANGE UP
GAS PNL BLDV: SIGNIFICANT CHANGE UP
GLUCOSE BLDV-MCNC: 90 MG/DL — SIGNIFICANT CHANGE UP (ref 70–99)
GLUCOSE SERPL-MCNC: 92 MG/DL — SIGNIFICANT CHANGE UP (ref 70–99)
HCO3 BLDV-SCNC: 32 MMOL/L — HIGH (ref 21–29)
HCT VFR BLD CALC: 39 % — SIGNIFICANT CHANGE UP (ref 39–50)
HCT VFR BLDA CALC: 40 % — SIGNIFICANT CHANGE UP (ref 39–50)
HGB BLD CALC-MCNC: 13 G/DL — SIGNIFICANT CHANGE UP (ref 13–17)
HGB BLD-MCNC: 13.1 G/DL — SIGNIFICANT CHANGE UP (ref 13–17)
LACTATE BLDV-MCNC: 0.6 MMOL/L — LOW (ref 0.7–2)
LYMPHOCYTES # BLD AUTO: 1.1 K/UL — SIGNIFICANT CHANGE UP (ref 1–3.3)
LYMPHOCYTES # BLD AUTO: 29.7 % — SIGNIFICANT CHANGE UP (ref 13–44)
MCHC RBC-ENTMCNC: 31.6 PG — SIGNIFICANT CHANGE UP (ref 27–34)
MCHC RBC-ENTMCNC: 33.7 GM/DL — SIGNIFICANT CHANGE UP (ref 32–36)
MCV RBC AUTO: 93.8 FL — SIGNIFICANT CHANGE UP (ref 80–100)
MONOCYTES # BLD AUTO: 0.4 K/UL — SIGNIFICANT CHANGE UP (ref 0–0.9)
MONOCYTES NFR BLD AUTO: 10.7 % — SIGNIFICANT CHANGE UP (ref 2–14)
NEUTROPHILS # BLD AUTO: 2 K/UL — SIGNIFICANT CHANGE UP (ref 1.8–7.4)
NEUTROPHILS NFR BLD AUTO: 52.2 % — SIGNIFICANT CHANGE UP (ref 43–77)
OTHER CELLS CSF MANUAL: 4 ML/DL — LOW (ref 18–22)
PCO2 BLDV: 56 MMHG — HIGH (ref 35–50)
PH BLDV: 7.37 — SIGNIFICANT CHANGE UP (ref 7.35–7.45)
PLATELET # BLD AUTO: 180 K/UL — SIGNIFICANT CHANGE UP (ref 150–400)
PO2 BLDV: 19 MMHG — LOW (ref 25–45)
POTASSIUM BLDV-SCNC: 5.3 MMOL/L — HIGH (ref 3.5–5)
POTASSIUM SERPL-MCNC: 4.7 MMOL/L — SIGNIFICANT CHANGE UP (ref 3.5–5.3)
POTASSIUM SERPL-SCNC: 4.7 MMOL/L — SIGNIFICANT CHANGE UP (ref 3.5–5.3)
PROT SERPL-MCNC: 7.6 G/DL — SIGNIFICANT CHANGE UP (ref 6–8.3)
RBC # BLD: 4.15 M/UL — LOW (ref 4.2–5.8)
RBC # FLD: 11.9 % — SIGNIFICANT CHANGE UP (ref 10.3–14.5)
SAO2 % BLDV: 23 % — LOW (ref 67–88)
SODIUM SERPL-SCNC: 137 MMOL/L — SIGNIFICANT CHANGE UP (ref 135–145)
TROPONIN T SERPL-MCNC: <0.01 NG/ML — SIGNIFICANT CHANGE UP (ref 0–0.06)
TSH SERPL-MCNC: 3.33 UIU/ML — SIGNIFICANT CHANGE UP (ref 0.27–4.2)
WBC # BLD: 3.8 K/UL — SIGNIFICANT CHANGE UP (ref 3.8–10.5)
WBC # FLD AUTO: 3.8 K/UL — SIGNIFICANT CHANGE UP (ref 3.8–10.5)

## 2017-11-06 PROCEDURE — 82435 ASSAY OF BLOOD CHLORIDE: CPT

## 2017-11-06 PROCEDURE — 84132 ASSAY OF SERUM POTASSIUM: CPT

## 2017-11-06 PROCEDURE — 83605 ASSAY OF LACTIC ACID: CPT

## 2017-11-06 PROCEDURE — 99285 EMERGENCY DEPT VISIT HI MDM: CPT | Mod: 25,GC

## 2017-11-06 PROCEDURE — 80053 COMPREHEN METABOLIC PANEL: CPT

## 2017-11-06 PROCEDURE — 93005 ELECTROCARDIOGRAM TRACING: CPT

## 2017-11-06 PROCEDURE — 84484 ASSAY OF TROPONIN QUANT: CPT

## 2017-11-06 PROCEDURE — 85014 HEMATOCRIT: CPT

## 2017-11-06 PROCEDURE — 82803 BLOOD GASES ANY COMBINATION: CPT

## 2017-11-06 PROCEDURE — 82330 ASSAY OF CALCIUM: CPT

## 2017-11-06 PROCEDURE — 84443 ASSAY THYROID STIM HORMONE: CPT

## 2017-11-06 PROCEDURE — 93010 ELECTROCARDIOGRAM REPORT: CPT

## 2017-11-06 PROCEDURE — 99283 EMERGENCY DEPT VISIT LOW MDM: CPT | Mod: 25

## 2017-11-06 PROCEDURE — 82962 GLUCOSE BLOOD TEST: CPT

## 2017-11-06 PROCEDURE — 85027 COMPLETE CBC AUTOMATED: CPT

## 2017-11-06 PROCEDURE — 84295 ASSAY OF SERUM SODIUM: CPT

## 2017-11-06 PROCEDURE — 82947 ASSAY GLUCOSE BLOOD QUANT: CPT

## 2017-11-06 NOTE — ED PROVIDER NOTE - ATTENDING CONTRIBUTION TO CARE
73yo M with h/o IDDM, presenting with sensation of facial flushing and difficulty sleeping; occuring over course of past 1-2 years, worsening in recent months; recently evaluated by pcp who precribed ativan.  On exam, is well appearing in NAD, FSG 80s, afebrile, but vitals notable for hypertension, which improved throughout ED course.  Cardiac: s1s2 no MGR, Lungs: CTABL, abd: soft nt/nd, no peripheral edema, no visible rash.  Unclear etiology of patient's symptoms, no fever or other symptoms that point to infectious etiology; no rash; normal HR and recent TSH level (wnl) by pcp make thyroid storm unlikely.  ECG NSR.  Labs unrevealing.  Patient stable for discharge with continued evaluation and management with his pcp.

## 2017-11-06 NOTE — ED PROVIDER NOTE - PHYSICAL EXAMINATION
Gen: Well appearing, NAD, AOx3  Head: NCAT  HEENT: MMM, normal conjunctiva  Lung: CTAB, no rales, rhonchi or wheezing  CV: S1/S2, no murmurs, rubs or gallops  Abd: soft, NTND, no rebound or guarding  MSK: No edema, no visible deformities  Neuro: No focal neurologic deficits.   Skin: Warm and dry, no evidence of rash  Psych: Appears anxious

## 2017-11-06 NOTE — ED PROVIDER NOTE - OBJECTIVE STATEMENT
72 y.o. male hx of DM on Humulin, HTN on carvedilol pw sensation of "feeling warm" for past 18 hours, took BP at home found to be 200s systolic, also states that he "cannot sleep" because of this sensation. Pt denies prior similar symptoms in the past. States that blood glucose at home was normal today. No recent illness, no fevers. States that he took extra half carvedilol without improvement.

## 2017-11-06 NOTE — ED ADULT NURSE NOTE - OBJECTIVE STATEMENT
72M comes to ED c/o feeling "hot" and high blood pressure since 9AM. Patient states he ate breakfast and after that felt very flushed and hot. He states he has been checking his BP and it is elevated. States he is unable to sleep. He also states he feels off balance. He is a&ox4 and appears nervous. Patient denies headache/blurry vision/chest pain/SOB/N/V/D/dizziness/fever/chills. He states he recently had GI workup and was cleared. States lost 30 lbs unintentionally over 6 months. On exam, clear lungs, soft nondistended abdomen, moves all extremities. BP measured immediately- patient found to be hypertensive. He states he took his proper BP meds. He also has PMH DM. Will continue to monitor.

## 2017-11-06 NOTE — ED PROVIDER NOTE - MEDICAL DECISION MAKING DETAILS
72 y.o. male pw elevated BP and "feeling warm" at home today, unable to sleep. Well appearing but very anxious. Normal exam. BP elevated here but no obv end organ injury. Will check labs, ekg, reevaluate.

## 2017-11-06 NOTE — ED ADULT NURSE NOTE - CHPI ED SYMPTOMS NEG
no chills/no dizziness/no fever/no vomiting/no nausea/no weakness/no tingling/no decreased eating/drinking/no numbness/no pain

## 2017-12-12 RX ORDER — TAMSULOSIN HYDROCHLORIDE 0.4 MG/1
1 CAPSULE ORAL
Qty: 0 | Refills: 0 | COMMUNITY

## 2017-12-12 RX ORDER — INSULIN NPH HUM/REG INSULIN HM 70-30/ML
0 VIAL (ML) SUBCUTANEOUS
Qty: 0 | Refills: 0 | COMMUNITY

## 2017-12-12 RX ORDER — LATANOPROST 0.05 MG/ML
1 SOLUTION/ DROPS OPHTHALMIC; TOPICAL
Qty: 0 | Refills: 0 | COMMUNITY

## 2017-12-12 RX ORDER — CARVEDILOL PHOSPHATE 80 MG/1
1 CAPSULE, EXTENDED RELEASE ORAL
Qty: 0 | Refills: 0 | COMMUNITY

## 2017-12-12 RX ORDER — BRIMONIDINE TARTRATE 2 MG/MG
0 SOLUTION/ DROPS OPHTHALMIC
Qty: 0 | Refills: 0 | COMMUNITY

## 2018-02-28 ENCOUNTER — RESULT REVIEW (OUTPATIENT)
Age: 73
End: 2018-02-28

## 2018-07-25 PROBLEM — Z78.9 ALCOHOL USE: Status: ACTIVE | Noted: 2017-04-25

## 2019-01-07 NOTE — ED CDU PROVIDER NOTE - OBSERVATION MONITORING PLAN
Pt c/o neck spasms that started last Friday. Pain is shooting down both shoulders and down right arm into her fingers. Pt states ibuprofen does not help. No known injury.
ED Record Reviewed

## 2020-11-08 NOTE — ED ADULT NURSE NOTE - NSSISCREENINGQ1_ED_A_ED
Patient agitated and inconsolable and attempting to climb out of bed.
Pt oral temperature 101.3F
pt resting in bed with tube feeds infusing via Rt nare keofeed. Pt noted with scant bloody drainage from right nare. VSS
pt tachycardic
pt. became disoriented and aggressive
No

## 2020-12-15 PROBLEM — Z12.11 ENCOUNTER FOR SCREENING COLONOSCOPY: Status: RESOLVED | Noted: 2017-10-31 | Resolved: 2020-12-15

## 2021-01-04 PROBLEM — E11.9 TYPE 2 DIABETES MELLITUS WITHOUT COMPLICATIONS: Chronic | Status: ACTIVE | Noted: 2017-11-06

## 2021-01-04 PROBLEM — N40.0 BENIGN PROSTATIC HYPERPLASIA WITHOUT LOWER URINARY TRACT SYMPTOMS: Chronic | Status: ACTIVE | Noted: 2017-11-06

## 2021-01-04 PROBLEM — I10 ESSENTIAL (PRIMARY) HYPERTENSION: Chronic | Status: ACTIVE | Noted: 2017-11-06

## 2021-02-01 NOTE — ED CDU PROVIDER NOTE - DATE/TIME 4
Quality 111:Pneumonia Vaccination Status For Older Adults: Pneumococcal Vaccination Previously Received Detail Level: Detailed Quality 110: Preventive Care And Screening: Influenza Immunization: Influenza Immunization previously received during influenza season Quality 137: Melanoma: Continuity Of Care - Recall System: Patient information entered into a recall system that includes: target date for the next exam specified AND a process to follow up with patients regarding missed or unscheduled appointments 10-Mar-2017 06:49

## 2021-02-11 ENCOUNTER — APPOINTMENT (OUTPATIENT)
Dept: OPHTHALMOLOGY | Facility: CLINIC | Age: 76
End: 2021-02-11

## 2021-03-30 ENCOUNTER — APPOINTMENT (OUTPATIENT)
Dept: MRI IMAGING | Facility: IMAGING CENTER | Age: 76
End: 2021-03-30

## 2021-05-21 ENCOUNTER — APPOINTMENT (OUTPATIENT)
Dept: HEPATOLOGY | Facility: CLINIC | Age: 76
End: 2021-05-21
Payer: MEDICARE

## 2021-05-21 VITALS
SYSTOLIC BLOOD PRESSURE: 136 MMHG | TEMPERATURE: 97.2 F | DIASTOLIC BLOOD PRESSURE: 80 MMHG | OXYGEN SATURATION: 99 % | HEIGHT: 72 IN | WEIGHT: 153 LBS | RESPIRATION RATE: 16 BRPM | HEART RATE: 81 BPM | BODY MASS INDEX: 20.72 KG/M2

## 2021-05-21 DIAGNOSIS — D18.03 HEMANGIOMA OF INTRA-ABDOMINAL STRUCTURES: ICD-10-CM

## 2021-05-21 PROCEDURE — 99204 OFFICE O/P NEW MOD 45 MIN: CPT

## 2021-05-21 PROCEDURE — 99072 ADDL SUPL MATRL&STAF TM PHE: CPT

## 2021-05-21 RX ORDER — BIFIDOBACTERIUM LONGUM 10MM CELL
4 CAPSULE ORAL
Qty: 30 | Refills: 3 | Status: DISCONTINUED | OUTPATIENT
Start: 2017-10-31 | End: 2021-05-21

## 2021-05-23 PROBLEM — D18.03 HEMANGIOMA OF LIVER: Status: ACTIVE | Noted: 2017-05-26

## 2021-05-23 NOTE — HISTORY OF PRESENT ILLNESS
[FreeTextEntry1] : 77 yo Male with Hx of DM, CKD, weight loss, Hx of prostate cancer s/p RT, being followed by GI (Dr. Allison) and noted to have a hemangioma (5.4 x 5.3 cm) on MRI abd in 2017 in the L hepatic lobe. He had renal US 3/4/21 showing 4.1x4.6x3.7 cm solid mass in liver and he states that he was referred here b/o this liver lesion. He also reports that after d/w his PCP, he was told that "not needed to come" because "this is the previously seen hemangioma", but he decided to come "from respect to the referring Dr. Pollock". 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  \par Denies complaints. \par

## 2021-05-23 NOTE — PHYSICAL EXAM
[Non-Tender] : non-tender [General Appearance - Alert] : alert [General Appearance - In No Acute Distress] : in no acute distress [General Appearance - Well Nourished] : well nourished [General Appearance - Well Developed] : well developed [Sclera] : the sclera and conjunctiva were normal [Oropharynx] : the oropharynx was normal [Neck Appearance] : the appearance of the neck was normal [] : no respiratory distress [Respiration, Rhythm And Depth] : normal respiratory rhythm and effort [Exaggerated Use Of Accessory Muscles For Inspiration] : no accessory muscle use [Auscultation Breath Sounds / Voice Sounds] : lungs were clear to auscultation bilaterally [Heart Rate And Rhythm] : heart rate was normal and rhythm regular [Heart Sounds] : normal S1 and S2 [Edema] : there was no peripheral edema [Flat] : flat [Normal] : normal [Soft, Nontender] : the abdomen was soft and nontender [No Mass] : no masses were palpated [No HSM] : no hepatosplenomegaly noted [None] : no CVA tenderness [Cervical Lymph Nodes Enlarged Posterior Bilaterally] : posterior cervical [Cervical Lymph Nodes Enlarged Anterior Bilaterally] : anterior cervical [Supraclavicular Lymph Nodes Enlarged Bilaterally] : supraclavicular [Axillary Lymph Nodes Enlarged Bilaterally] : axillary [Inguinal Lymph Nodes Enlarged Bilaterally] : inguinal [No CVA Tenderness] : no ~M costovertebral angle tenderness [No Spinal Tenderness] : no spinal tenderness [Abnormal Walk] : normal gait [Skin Color & Pigmentation] : normal skin color and pigmentation [Oriented To Time, Place, And Person] : oriented to person, place, and time [Impaired Insight] : insight and judgment were intact [Affect] : the affect was normal [Mood] : the mood was normal [Scleral Icterus] : No Scleral Icterus [Spider Angioma] : No spider angioma(s) were observed [Abdominal  Ascites] : no ascites [Liver Palpable ___ Finger Breadths Below Costal Margin] : was not palpable below costal margin [Asterixis] : no asterixis observed [Jaundice] : No jaundice [Palmar Erythema] : no Palmar Erythema [Depression] : no depression [Dilated Collat. Veins] : no collateral vein dilation [Striae] : no striae [Chong's] : a negative Chong's sign [FreeTextEntry1] : Grossly intact

## 2021-05-23 NOTE — ASSESSMENT
[FreeTextEntry1] : 77 yo Male with Hx of DM, CKD, weight loss, Hx of prostate cancer s/p RT, being followed by GI (Dr. Allison) and noted to have a hemangioma (5.4 x 5.3 cm) on MRI abd in 2017 in the L hepatic lobe. He had renal US 3/4/21 showing 4.1x4.6x3.7 cm solid mass in liver and he states that he was referred here b/o this liver lesion.\par \par Discussed that why possibility that the same lesion, because both in L lobe, but difficult to compared MR to US, and would need repeat MRI abd.\par Had lengthy discussion with patient about differential, including possibility of malignancy, but he deferred any further tests or imaging this time, stating that her daughter is sick and he will not have any time next few weeks. Discussed that MRI can be done at weekends too, but he still deferred and did not want to start authorization process either. \par \par Asked patient to call out office when ready to do further tests. He deferred blood draw too.\par He agreed to call our office. Advised repeatedly that clarification ASAP would be recommended to assure not to delay care in case it id different from the previously seen hemangioma.  He acknowledged to understand.\par \par RTC  - Pt to call our office w/in 2 weeks if agrees to further tests / follow up

## 2021-06-07 ENCOUNTER — NON-APPOINTMENT (OUTPATIENT)
Age: 76
End: 2021-06-07

## 2021-07-13 ENCOUNTER — NON-APPOINTMENT (OUTPATIENT)
Age: 76
End: 2021-07-13

## 2021-08-04 ENCOUNTER — NON-APPOINTMENT (OUTPATIENT)
Age: 76
End: 2021-08-04

## 2021-08-04 ENCOUNTER — APPOINTMENT (OUTPATIENT)
Dept: OPHTHALMOLOGY | Facility: CLINIC | Age: 76
End: 2021-08-04
Payer: MEDICARE

## 2021-08-04 PROCEDURE — 92004 COMPRE OPH EXAM NEW PT 1/>: CPT

## 2021-08-04 PROCEDURE — 92250 FUNDUS PHOTOGRAPHY W/I&R: CPT

## 2021-08-04 PROCEDURE — 92285 EXTERNAL OCULAR PHOTOGRAPHY: CPT

## 2021-08-04 PROCEDURE — 92286 ANT SGM IMG I&R SPECLR MIC: CPT

## 2021-09-01 ENCOUNTER — APPOINTMENT (OUTPATIENT)
Dept: OPHTHALMOLOGY | Facility: CLINIC | Age: 76
End: 2021-09-01
Payer: MEDICARE

## 2021-09-01 ENCOUNTER — NON-APPOINTMENT (OUTPATIENT)
Age: 76
End: 2021-09-01

## 2021-09-01 PROCEDURE — 76514 ECHO EXAM OF EYE THICKNESS: CPT

## 2021-09-01 PROCEDURE — 92015 DETERMINE REFRACTIVE STATE: CPT

## 2021-09-01 PROCEDURE — 92133 CPTRZD OPH DX IMG PST SGM ON: CPT

## 2021-09-01 PROCEDURE — 92012 INTRM OPH EXAM EST PATIENT: CPT

## 2021-09-01 PROCEDURE — 92083 EXTENDED VISUAL FIELD XM: CPT

## 2021-11-02 ENCOUNTER — APPOINTMENT (OUTPATIENT)
Dept: OPHTHALMOLOGY | Facility: CLINIC | Age: 76
End: 2021-11-02

## 2022-02-03 ENCOUNTER — NON-APPOINTMENT (OUTPATIENT)
Age: 77
End: 2022-02-03

## 2022-02-03 ENCOUNTER — APPOINTMENT (OUTPATIENT)
Dept: OPHTHALMOLOGY | Facility: CLINIC | Age: 77
End: 2022-02-03
Payer: COMMERCIAL

## 2022-02-03 PROCEDURE — 92012 INTRM OPH EXAM EST PATIENT: CPT

## 2022-03-01 NOTE — ED PROVIDER NOTE - NS ED MD EM SELECTION
How Severe Is Your Skin Lesion?: mild
Have Your Skin Lesions Been Treated?: not been treated
Is This A New Presentation, Or A Follow-Up?: Skin Lesions
[address] Detailed

## 2022-03-16 ENCOUNTER — APPOINTMENT (OUTPATIENT)
Dept: HEPATOLOGY | Facility: CLINIC | Age: 77
End: 2022-03-16
Payer: MEDICARE

## 2022-03-16 VITALS
TEMPERATURE: 97.3 F | BODY MASS INDEX: 21.4 KG/M2 | DIASTOLIC BLOOD PRESSURE: 72 MMHG | RESPIRATION RATE: 16 BRPM | OXYGEN SATURATION: 95 % | SYSTOLIC BLOOD PRESSURE: 143 MMHG | WEIGHT: 158 LBS | HEIGHT: 72 IN | HEART RATE: 73 BPM

## 2022-03-16 DIAGNOSIS — K76.9 LIVER DISEASE, UNSPECIFIED: ICD-10-CM

## 2022-03-16 DIAGNOSIS — R74.8 ABNORMAL LEVELS OF OTHER SERUM ENZYMES: ICD-10-CM

## 2022-03-16 PROCEDURE — 99214 OFFICE O/P EST MOD 30 MIN: CPT

## 2022-03-16 RX ORDER — LORAZEPAM 2 MG/1
TABLET ORAL
Refills: 0 | Status: ACTIVE | COMMUNITY

## 2022-03-17 LAB
AFP-TM SERPL-MCNC: 5.1 NG/ML
ALBUMIN SERPL ELPH-MCNC: 3.9 G/DL
ALP BLD-CCNC: 165 U/L
ALT SERPL-CCNC: 14 U/L
APTT BLD: 28.8 SEC
AST SERPL-CCNC: 19 U/L
BILIRUB DIRECT SERPL-MCNC: 0.2 MG/DL
BILIRUB INDIRECT SERPL-MCNC: 0.4 MG/DL
BILIRUB SERPL-MCNC: 0.5 MG/DL
CANCER AG19-9 SERPL-ACNC: 24 U/ML
CEA SERPL-MCNC: 2.1 NG/ML
GGT SERPL-CCNC: 16 U/L
HBV CORE IGG+IGM SER QL: NONREACTIVE
HBV SURFACE AB SER QL: NONREACTIVE
HBV SURFACE AG SER QL: NONREACTIVE
HCV AB SER QL: NONREACTIVE
HCV S/CO RATIO: 0.38 S/CO
HEPATITIS A IGG ANTIBODY: NONREACTIVE
INR PPP: 0.99 RATIO
PROT SERPL-MCNC: 6.2 G/DL
PT BLD: 11.6 SEC

## 2022-03-18 PROBLEM — R74.8 ELEVATED ALKALINE PHOSPHATASE LEVEL: Status: ACTIVE | Noted: 2022-03-18

## 2022-03-18 NOTE — HISTORY OF PRESENT ILLNESS
[FreeTextEntry1] : 76 yo Male with Hx of DM, CKD, weight loss, Hx of prostate cancer s/p RT, being followed by GI (Dr. Allison) and noted to have a hemangioma (5.4 x 5.3 cm) on MRI abd in 2017 in the L hepatic lobe. He had renal US 3/4/21 showing 4.1x4.6x3.7 cm solid mass in liver and he stated that he was referred here b/o this liver lesion. He also reports that after d/w his PCP, he was told that "not needed to come" because "this is the previously seen hemangioma", but he decided to come "from respect to the referring Dr. Pollock". 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  \par He was seen for initial visit on 5/21/22, but lost follow up and never did MRI b/o taking care of his sick daughter.\par \par Brought in labs 3/7/22:  chol 188, LDL 95, TG 74, BUN 30, Cr 1.67, AST 22, ALT 15, , se bi 0.8, WBC 2.8, Hb 12.8, , TSH 1.68, HbA1c 7.2, \par Labs 2/22/22: AST 18, ALT 13, , alb 3.7, bili 0.6, A1c 7.4\par \par Now he returns to clinic for same reason. Recent outside labs showing 1.5x ALP elevation. \par Overall feels well. \par

## 2022-03-18 NOTE — ASSESSMENT
[FreeTextEntry1] : 76 yo Male with Hx of DM, CKD, weight loss, Hx of prostate cancer s/p RT, being followed by GI (Dr. Allison) and noted to have a hemangioma (5.4 x 5.3 cm) on MRI abd in 2017 in the L hepatic lobe. He had renal US 3/4/21 showing 4.1x4.6x3.7 cm solid mass in liver and he states that he was referred here b/o this liver lesion.\par \par Discussed that why possibility that the same lesion, because both in L lobe, but difficult to compared MR to US, and would need repeat MRI abd.\par Had lengthy discussion with patient about differential, including possibility of malignancy. He is agreeable to do MRI now. Task sent for PA.\par \par Ordered additional blood work as below, including tumor markers and fractionation of ALP.\par \par RTC  1 month with results

## 2022-03-18 NOTE — PHYSICAL EXAM
[Non-Tender] : non-tender [General Appearance - Alert] : alert [General Appearance - In No Acute Distress] : in no acute distress [General Appearance - Well Nourished] : well nourished [General Appearance - Well Developed] : well developed [Sclera] : the sclera and conjunctiva were normal [Oropharynx] : the oropharynx was normal [Neck Appearance] : the appearance of the neck was normal [] : no respiratory distress [Respiration, Rhythm And Depth] : normal respiratory rhythm and effort [Exaggerated Use Of Accessory Muscles For Inspiration] : no accessory muscle use [Auscultation Breath Sounds / Voice Sounds] : lungs were clear to auscultation bilaterally [Heart Rate And Rhythm] : heart rate was normal and rhythm regular [Heart Sounds] : normal S1 and S2 [Edema] : there was no peripheral edema [Cervical Lymph Nodes Enlarged Posterior Bilaterally] : posterior cervical [Cervical Lymph Nodes Enlarged Anterior Bilaterally] : anterior cervical [Supraclavicular Lymph Nodes Enlarged Bilaterally] : supraclavicular [Axillary Lymph Nodes Enlarged Bilaterally] : axillary [Inguinal Lymph Nodes Enlarged Bilaterally] : inguinal [No CVA Tenderness] : no ~M costovertebral angle tenderness [No Spinal Tenderness] : no spinal tenderness [Abnormal Walk] : normal gait [Skin Color & Pigmentation] : normal skin color and pigmentation [Impaired Insight] : insight and judgment were intact [Oriented To Time, Place, And Person] : oriented to person, place, and time [Affect] : the affect was normal [Mood] : the mood was normal [Flat] : flat [Normal] : normal [Soft, Nontender] : the abdomen was soft and nontender [No Mass] : no masses were palpated [No HSM] : no hepatosplenomegaly noted [None] : no CVA tenderness [Scleral Icterus] : No Scleral Icterus [Hepatojugular Reflux] : patient did not have a sustained hepatojugular reflux [Spider Angioma] : No spider angioma(s) were observed [Abdominal  Ascites] : no ascites [Liver Palpable ___ Finger Breadths Below Costal Margin] : was not palpable below costal margin [Asterixis] : no asterixis observed [Jaundice] : No jaundice [Palmar Erythema] : no Palmar Erythema [Depression] : no depression [FreeTextEntry1] : Grossly intact [Dilated Collat. Veins] : no collateral vein dilation [Striae] : no striae [Firm] : not firm [Rebound] : no rebound [Guarding] : no guarding [Chong's] : a negative Chong's sign

## 2022-03-21 LAB
ALP BLD-CCNC: 166 IU/L
ALP BONE CFR SERPL: 47 %
ALP INTEST CFR SERPL: 6 %
ALP LIVER CFR SERPL: 47 %

## 2022-04-18 LAB — ALP BONE SERPL-MCNC: 38.5 UG/L

## 2022-06-16 ENCOUNTER — APPOINTMENT (OUTPATIENT)
Dept: OPHTHALMOLOGY | Facility: CLINIC | Age: 77
End: 2022-06-16
Payer: MEDICARE

## 2022-06-16 ENCOUNTER — NON-APPOINTMENT (OUTPATIENT)
Age: 77
End: 2022-06-16

## 2022-06-16 PROCEDURE — 92014 COMPRE OPH EXAM EST PT 1/>: CPT

## 2022-08-27 ENCOUNTER — OUTPATIENT (OUTPATIENT)
Dept: OUTPATIENT SERVICES | Facility: HOSPITAL | Age: 77
LOS: 1 days | End: 2022-08-27
Payer: MEDICARE

## 2022-08-27 ENCOUNTER — APPOINTMENT (OUTPATIENT)
Dept: MRI IMAGING | Facility: IMAGING CENTER | Age: 77
End: 2022-08-27

## 2022-08-27 DIAGNOSIS — D18.03 HEMANGIOMA OF INTRA-ABDOMINAL STRUCTURES: ICD-10-CM

## 2022-08-27 PROCEDURE — 74183 MRI ABD W/O CNTR FLWD CNTR: CPT | Mod: 26

## 2022-08-27 PROCEDURE — A9585: CPT

## 2022-08-27 PROCEDURE — 74183 MRI ABD W/O CNTR FLWD CNTR: CPT

## 2023-01-12 ENCOUNTER — APPOINTMENT (OUTPATIENT)
Dept: OPHTHALMOLOGY | Facility: CLINIC | Age: 78
End: 2023-01-12

## 2023-03-06 ENCOUNTER — NON-APPOINTMENT (OUTPATIENT)
Age: 78
End: 2023-03-06

## 2023-03-06 ENCOUNTER — APPOINTMENT (OUTPATIENT)
Dept: OPHTHALMOLOGY | Facility: CLINIC | Age: 78
End: 2023-03-06
Payer: MEDICARE

## 2023-03-06 PROCEDURE — 92014 COMPRE OPH EXAM EST PT 1/>: CPT

## 2023-03-27 ENCOUNTER — NON-APPOINTMENT (OUTPATIENT)
Age: 78
End: 2023-03-27

## 2023-03-27 ENCOUNTER — APPOINTMENT (OUTPATIENT)
Dept: OPHTHALMOLOGY | Facility: CLINIC | Age: 78
End: 2023-03-27
Payer: MEDICARE

## 2023-03-27 PROCEDURE — 92133 CPTRZD OPH DX IMG PST SGM ON: CPT

## 2023-03-27 PROCEDURE — 92012 INTRM OPH EXAM EST PATIENT: CPT

## 2023-03-27 PROCEDURE — 92025 CPTRIZED CORNEAL TOPOGRAPHY: CPT

## 2023-04-19 NOTE — ED PROVIDER NOTE - NS ED ATTENDING STATEMENT MOD
I have personally seen and examined this patient. I have fully participated in the care of this patient. I have reviewed all pertinent clinical information, including history physical exam, plan and the Resident's note and agree except as noted Spironolactone Counseling: Patient advised regarding risks of diarrhea, abdominal pain, hyperkalemia, birth defects (for female patients), liver toxicity and renal toxicity. The patient may need blood work to monitor liver and kidney function and potassium levels while on therapy. The patient verbalized understanding of the proper use and possible adverse effects of spironolactone.  All of the patient's questions and concerns were addressed.

## 2023-07-26 ENCOUNTER — EMERGENCY (EMERGENCY)
Facility: HOSPITAL | Age: 78
LOS: 1 days | Discharge: HOME | End: 2023-07-26
Attending: EMERGENCY MEDICINE | Admitting: EMERGENCY MEDICINE
Payer: MEDICARE

## 2023-07-26 VITALS
DIASTOLIC BLOOD PRESSURE: 89 MMHG | RESPIRATION RATE: 18 BRPM | HEART RATE: 66 BPM | TEMPERATURE: 98 F | OXYGEN SATURATION: 100 % | SYSTOLIC BLOOD PRESSURE: 158 MMHG

## 2023-07-26 PROCEDURE — 99285 EMERGENCY DEPT VISIT HI MDM: CPT

## 2023-07-26 NOTE — ED ADULT NURSE NOTE - CAS DISCH CONDITION
Last Written Prescription Date:  6/15/2020  Last Fill Quantity: 90,  # refills: 1   Last office visit: 3/3/2020 with prescribing provider:  Dr. Leiva with advised F/U in 1 month for Diabetes F/U and med check     Future Office Visit: none    Routing refill request to provider for review/approval because:  Drug not on the [facility] refill protocol     Irene Zacarias RN, BSN       Improved

## 2023-07-26 NOTE — ED ADULT NURSE NOTE - NSFALLUNIVINTERV_ED_ALL_ED
Bed/Stretcher in lowest position, wheels locked, appropriate side rails in place/Call bell, personal items and telephone in reach/Instruct patient to call for assistance before getting out of bed/chair/stretcher/Non-slip footwear applied when patient is off stretcher/[place] to call system/Physically safe environment - no spills, clutter or unnecessary equipment/Purposeful proactive rounding/Room/bathroom lighting operational, light cord in reach

## 2023-07-26 NOTE — ED ADULT NURSE NOTE - OBJECTIVE STATEMENT
78 year old male A*Ox4, c/o worsening abdominal pain x 2 days. Endorses nausea, constipation, passing gas. Denies vomiting, fever, chills, urinary symptoms, recent travel, abdominal surgeries. Abdomen soft tender, bowel sounds in all quadrants.

## 2023-07-27 VITALS
OXYGEN SATURATION: 100 % | TEMPERATURE: 98 F | SYSTOLIC BLOOD PRESSURE: 143 MMHG | DIASTOLIC BLOOD PRESSURE: 66 MMHG | HEART RATE: 70 BPM | RESPIRATION RATE: 17 BRPM

## 2023-07-27 LAB
ALBUMIN SERPL ELPH-MCNC: 3.6 G/DL — SIGNIFICANT CHANGE UP (ref 3.3–5)
ALP SERPL-CCNC: 175 U/L — HIGH (ref 40–120)
ALT FLD-CCNC: 14 U/L — SIGNIFICANT CHANGE UP (ref 4–41)
ANION GAP SERPL CALC-SCNC: 12 MMOL/L — SIGNIFICANT CHANGE UP (ref 7–14)
APPEARANCE UR: CLEAR — SIGNIFICANT CHANGE UP
AST SERPL-CCNC: 24 U/L — SIGNIFICANT CHANGE UP (ref 4–40)
BACTERIA # UR AUTO: NEGATIVE /HPF — SIGNIFICANT CHANGE UP
BASE EXCESS BLDV CALC-SCNC: 2 MMOL/L — SIGNIFICANT CHANGE UP (ref -2–3)
BASOPHILS # BLD AUTO: 0.02 K/UL — SIGNIFICANT CHANGE UP (ref 0–0.2)
BASOPHILS NFR BLD AUTO: 0.5 % — SIGNIFICANT CHANGE UP (ref 0–2)
BILIRUB SERPL-MCNC: 0.7 MG/DL — SIGNIFICANT CHANGE UP (ref 0.2–1.2)
BILIRUB UR-MCNC: NEGATIVE — SIGNIFICANT CHANGE UP
BLOOD GAS VENOUS COMPREHENSIVE RESULT: SIGNIFICANT CHANGE UP
BUN SERPL-MCNC: 23 MG/DL — SIGNIFICANT CHANGE UP (ref 7–23)
CALCIUM SERPL-MCNC: 9.1 MG/DL — SIGNIFICANT CHANGE UP (ref 8.4–10.5)
CAST: 0 /LPF — SIGNIFICANT CHANGE UP (ref 0–4)
CHLORIDE BLDV-SCNC: 100 MMOL/L — SIGNIFICANT CHANGE UP (ref 96–108)
CHLORIDE SERPL-SCNC: 101 MMOL/L — SIGNIFICANT CHANGE UP (ref 98–107)
CO2 BLDV-SCNC: 29.2 MMOL/L — HIGH (ref 22–26)
CO2 SERPL-SCNC: 22 MMOL/L — SIGNIFICANT CHANGE UP (ref 22–31)
COLOR SPEC: YELLOW — SIGNIFICANT CHANGE UP
CREAT SERPL-MCNC: 1.72 MG/DL — HIGH (ref 0.5–1.3)
DIFF PNL FLD: NEGATIVE — SIGNIFICANT CHANGE UP
EGFR: 41 ML/MIN/1.73M2 — LOW
EOSINOPHIL # BLD AUTO: 0.07 K/UL — SIGNIFICANT CHANGE UP (ref 0–0.5)
EOSINOPHIL NFR BLD AUTO: 1.6 % — SIGNIFICANT CHANGE UP (ref 0–6)
GAS PNL BLDV: 134 MMOL/L — LOW (ref 136–145)
GLUCOSE BLDV-MCNC: 129 MG/DL — HIGH (ref 70–99)
GLUCOSE SERPL-MCNC: 133 MG/DL — HIGH (ref 70–99)
GLUCOSE UR QL: NEGATIVE MG/DL — SIGNIFICANT CHANGE UP
HCO3 BLDV-SCNC: 28 MMOL/L — SIGNIFICANT CHANGE UP (ref 22–29)
HCT VFR BLD CALC: 39.3 % — SIGNIFICANT CHANGE UP (ref 39–50)
HCT VFR BLDA CALC: 38 % — LOW (ref 39–51)
HGB BLD CALC-MCNC: 12.8 G/DL — SIGNIFICANT CHANGE UP (ref 12.6–17.4)
HGB BLD-MCNC: 12.7 G/DL — LOW (ref 13–17)
IANC: 2.57 K/UL — SIGNIFICANT CHANGE UP (ref 1.8–7.4)
IMM GRANULOCYTES NFR BLD AUTO: 0.2 % — SIGNIFICANT CHANGE UP (ref 0–0.9)
KETONES UR-MCNC: NEGATIVE MG/DL — SIGNIFICANT CHANGE UP
LACTATE BLDV-MCNC: 1.1 MMOL/L — SIGNIFICANT CHANGE UP (ref 0.5–2)
LEUKOCYTE ESTERASE UR-ACNC: NEGATIVE — SIGNIFICANT CHANGE UP
LIDOCAIN IGE QN: 20 U/L — SIGNIFICANT CHANGE UP (ref 7–60)
LYMPHOCYTES # BLD AUTO: 1.05 K/UL — SIGNIFICANT CHANGE UP (ref 1–3.3)
LYMPHOCYTES # BLD AUTO: 24.1 % — SIGNIFICANT CHANGE UP (ref 13–44)
MCHC RBC-ENTMCNC: 28.7 PG — SIGNIFICANT CHANGE UP (ref 27–34)
MCHC RBC-ENTMCNC: 32.3 GM/DL — SIGNIFICANT CHANGE UP (ref 32–36)
MCV RBC AUTO: 88.9 FL — SIGNIFICANT CHANGE UP (ref 80–100)
MONOCYTES # BLD AUTO: 0.63 K/UL — SIGNIFICANT CHANGE UP (ref 0–0.9)
MONOCYTES NFR BLD AUTO: 14.5 % — HIGH (ref 2–14)
NEUTROPHILS # BLD AUTO: 2.57 K/UL — SIGNIFICANT CHANGE UP (ref 1.8–7.4)
NEUTROPHILS NFR BLD AUTO: 59.1 % — SIGNIFICANT CHANGE UP (ref 43–77)
NITRITE UR-MCNC: NEGATIVE — SIGNIFICANT CHANGE UP
NRBC # BLD: 0 /100 WBCS — SIGNIFICANT CHANGE UP (ref 0–0)
NRBC # FLD: 0 K/UL — SIGNIFICANT CHANGE UP (ref 0–0)
PCO2 BLDV: 47 MMHG — SIGNIFICANT CHANGE UP (ref 42–55)
PH BLDV: 7.38 — SIGNIFICANT CHANGE UP (ref 7.32–7.43)
PH UR: 7 — SIGNIFICANT CHANGE UP (ref 5–8)
PLATELET # BLD AUTO: 183 K/UL — SIGNIFICANT CHANGE UP (ref 150–400)
PO2 BLDV: 41 MMHG — SIGNIFICANT CHANGE UP (ref 25–45)
POTASSIUM BLDV-SCNC: 4 MMOL/L — SIGNIFICANT CHANGE UP (ref 3.5–5.1)
POTASSIUM SERPL-MCNC: 4.2 MMOL/L — SIGNIFICANT CHANGE UP (ref 3.5–5.3)
POTASSIUM SERPL-SCNC: 4.2 MMOL/L — SIGNIFICANT CHANGE UP (ref 3.5–5.3)
PROT SERPL-MCNC: 6.9 G/DL — SIGNIFICANT CHANGE UP (ref 6–8.3)
PROT UR-MCNC: 30 MG/DL
RBC # BLD: 4.42 M/UL — SIGNIFICANT CHANGE UP (ref 4.2–5.8)
RBC # FLD: 13 % — SIGNIFICANT CHANGE UP (ref 10.3–14.5)
RBC CASTS # UR COMP ASSIST: 0 /HPF — SIGNIFICANT CHANGE UP (ref 0–4)
SAO2 % BLDV: 69.9 % — SIGNIFICANT CHANGE UP (ref 67–88)
SODIUM SERPL-SCNC: 135 MMOL/L — SIGNIFICANT CHANGE UP (ref 135–145)
SP GR SPEC: 1.01 — SIGNIFICANT CHANGE UP (ref 1–1.03)
SQUAMOUS # UR AUTO: 0 /HPF — SIGNIFICANT CHANGE UP (ref 0–5)
TROPONIN T, HIGH SENSITIVITY RESULT: 35 NG/L — SIGNIFICANT CHANGE UP
TROPONIN T, HIGH SENSITIVITY RESULT: 43 NG/L — SIGNIFICANT CHANGE UP
UROBILINOGEN FLD QL: 0.2 MG/DL — SIGNIFICANT CHANGE UP (ref 0.2–1)
WBC # BLD: 4.35 K/UL — SIGNIFICANT CHANGE UP (ref 3.8–10.5)
WBC # FLD AUTO: 4.35 K/UL — SIGNIFICANT CHANGE UP (ref 3.8–10.5)
WBC UR QL: 0 /HPF — SIGNIFICANT CHANGE UP (ref 0–5)

## 2023-07-27 PROCEDURE — 74177 CT ABD & PELVIS W/CONTRAST: CPT | Mod: 26,MA

## 2023-07-27 PROCEDURE — 93010 ELECTROCARDIOGRAM REPORT: CPT

## 2023-07-27 RX ORDER — KETOROLAC TROMETHAMINE 30 MG/ML
15 SYRINGE (ML) INJECTION ONCE
Refills: 0 | Status: DISCONTINUED | OUTPATIENT
Start: 2023-07-27 | End: 2023-07-27

## 2023-07-27 RX ORDER — MULTIVIT WITH MIN/MFOLATE/K2 340-15/3 G
296 POWDER (GRAM) ORAL ONCE
Refills: 0 | Status: COMPLETED | OUTPATIENT
Start: 2023-07-27 | End: 2023-07-27

## 2023-07-27 RX ORDER — FAMOTIDINE 10 MG/ML
20 INJECTION INTRAVENOUS ONCE
Refills: 0 | Status: COMPLETED | OUTPATIENT
Start: 2023-07-27 | End: 2023-07-27

## 2023-07-27 RX ORDER — SODIUM CHLORIDE 9 MG/ML
1000 INJECTION INTRAMUSCULAR; INTRAVENOUS; SUBCUTANEOUS ONCE
Refills: 0 | Status: COMPLETED | OUTPATIENT
Start: 2023-07-27 | End: 2023-07-27

## 2023-07-27 RX ORDER — POLYETHYLENE GLYCOL 3350 17 G/17G
17 POWDER, FOR SOLUTION ORAL ONCE
Refills: 0 | Status: COMPLETED | OUTPATIENT
Start: 2023-07-27 | End: 2023-07-27

## 2023-07-27 RX ORDER — METOCLOPRAMIDE HCL 10 MG
10 TABLET ORAL ONCE
Refills: 0 | Status: COMPLETED | OUTPATIENT
Start: 2023-07-27 | End: 2023-07-27

## 2023-07-27 RX ADMIN — FAMOTIDINE 20 MILLIGRAM(S): 10 INJECTION INTRAVENOUS at 00:48

## 2023-07-27 RX ADMIN — Medication 15 MILLIGRAM(S): at 00:48

## 2023-07-27 RX ADMIN — SODIUM CHLORIDE 1000 MILLILITER(S): 9 INJECTION INTRAMUSCULAR; INTRAVENOUS; SUBCUTANEOUS at 00:50

## 2023-07-27 RX ADMIN — Medication 1 ENEMA: at 05:53

## 2023-07-27 RX ADMIN — Medication 15 MILLIGRAM(S): at 01:22

## 2023-07-27 RX ADMIN — Medication 30 MILLILITER(S): at 00:48

## 2023-07-27 RX ADMIN — Medication 296 MILLILITER(S): at 04:49

## 2023-07-27 RX ADMIN — Medication 10 MILLIGRAM(S): at 00:48

## 2023-07-27 NOTE — ED PROVIDER NOTE - NSICDXPASTMEDICALHX_GEN_ALL_CORE_FT
PAST MEDICAL HISTORY:  BPH (benign prostatic hypertrophy)     Diabetes type 1    Diabetes mellitus x 25 years    Enlarged prostate     Glaucoma     HTN (hypertension)     Hypertension

## 2023-07-27 NOTE — ED PROVIDER NOTE - NS ED ATTENDING STATEMENT MOD
This was a shared visit with the O[facility]. I reviewed and verified the documentation and independently performed the documented:

## 2023-07-27 NOTE — ED PROVIDER NOTE - OBJECTIVE STATEMENT
78 year old male with PMH of HTN and DM presents to the ED complaining of mid-abdominal pain for a week. Pt states he started to have mid abdominal pain, worse with eating, no relieving factors. Reports associated constipation. Denies nausea, vomiting, diarrhea, bloody stool, black stool, fevers, and chills. Denies chest pain, dyspnea, weakness, numbness or tingling sensation, fevers, and chills. Denies any other complaints.

## 2023-07-27 NOTE — ED ADULT NURSE REASSESSMENT NOTE - NS ED NURSE REASSESS COMMENT FT1
Pt had medium BM, partial relief.
Pt awake in bed awaiting magnesium citrate, mild abdominal pain, no other complaints call bell in reach.

## 2023-07-27 NOTE — ED PROVIDER NOTE - CLINICAL SUMMARY MEDICAL DECISION MAKING FREE TEXT BOX
78 year old male with PMH of HTN and DM presents to the ED complaining of mid-abdominal pain for a week. HD stable. On exam, abdomen tender to upper abdominal area, some generalized voluntary guarding, audible BS. Imp: Abd pain and constipation, r/o bowel obstruction. Plan for labs, antiemetic, analgesics, CT A/P, reassess.

## 2023-07-27 NOTE — ED PROVIDER NOTE - ATTENDING APP SHARED VISIT CONTRIBUTION OF CARE
78-year-old male past medical history of diabetes and hypertension presenting with 1 week of mid epigastric abdominal pain.  It is worse with eating.  There is been constipation as well.  Pain does not radiate.  There is no nausea vomiting diarrhea or fevers.    Vitals: I have reviewed the patients vital signs  General: nontoxic appearing  HEENT: Atraumatic, normocephalic, airway patent  Eyes: EOMI, tracking appropriately  Neck: no tracheal deviation  Chest/Lungs: no trauma, symmetric chest rise, speaking in complete sentences,  no resp distress  Heart: skin and extremities well perfused, regular rate and rhythm  Neuro: A+Ox3, appears non focal  MSK: no deformities  Skin: no cyanosis, no jaundice   Psych:  Normal mood and affect  Abdomen: Soft tender to palpation throughout no guarding no rebound    78-year-old male history of hypertension and diabetes with abdominal pain for a week.  He does have a tender abdomen.  Differential includes was not limited to gallbladder disease, pancreatitis, peptic ulcer disease.  Based on age we will get CT scan as well as laboratory studies.  We will treat symptomatically and reassess

## 2023-07-27 NOTE — ED PROVIDER NOTE - PATIENT PORTAL LINK FT
You can access the FollowMyHealth Patient Portal offered by [facility] by registering at the following website: [email or web address]. By joining [employer]’s FollowMyHealth portal, you will also be able to view your health information using other applications (apps) compatible with our system.

## 2023-07-27 NOTE — ED PROVIDER NOTE - PROGRESS NOTE DETAILS
YVONNE Parker: Labs reviewed. CT A/P shows no bowel obstruction. Pt reassessed, states he feels better. Abdomen non-acute. Tolerating oral intake well. stable for dc home. PMD follow up. GI referral. Strict return precautions.

## 2023-08-21 ENCOUNTER — APPOINTMENT (OUTPATIENT)
Dept: OPHTHALMOLOGY | Facility: CLINIC | Age: 78
End: 2023-08-21

## 2023-08-28 ENCOUNTER — NON-APPOINTMENT (OUTPATIENT)
Age: 78
End: 2023-08-28

## 2023-08-28 ENCOUNTER — APPOINTMENT (OUTPATIENT)
Dept: OPHTHALMOLOGY | Facility: CLINIC | Age: 78
End: 2023-08-28
Payer: MEDICARE

## 2023-08-28 PROCEDURE — 92012 INTRM OPH EXAM EST PATIENT: CPT

## 2023-08-28 PROCEDURE — 92015 DETERMINE REFRACTIVE STATE: CPT

## 2023-10-31 NOTE — END OF VISIT
[Time Spent: ___ minutes] : I have spent [unfilled] minutes of time on the encounter. FAMILY HISTORY:  Father  Still living? No  CAD (coronary artery disease), Age at diagnosis: Age Unknown

## 2024-01-22 ENCOUNTER — APPOINTMENT (OUTPATIENT)
Dept: OPHTHALMOLOGY | Facility: CLINIC | Age: 79
End: 2024-01-22
Payer: MEDICARE

## 2024-01-22 ENCOUNTER — NON-APPOINTMENT (OUTPATIENT)
Age: 79
End: 2024-01-22

## 2024-01-22 PROCEDURE — 92133 CPTRZD OPH DX IMG PST SGM ON: CPT

## 2024-01-22 PROCEDURE — 92014 COMPRE OPH EXAM EST PT 1/>: CPT

## 2024-01-22 PROCEDURE — 92285 EXTERNAL OCULAR PHOTOGRAPHY: CPT

## 2024-01-22 PROCEDURE — 92015 DETERMINE REFRACTIVE STATE: CPT

## 2024-06-26 ENCOUNTER — APPOINTMENT (OUTPATIENT)
Dept: OPHTHALMOLOGY | Facility: CLINIC | Age: 79
End: 2024-06-26

## 2024-12-24 PROBLEM — F10.90 ALCOHOL USE: Status: ACTIVE | Noted: 2017-04-25

## 2025-02-26 ENCOUNTER — APPOINTMENT (OUTPATIENT)
Dept: OPHTHALMOLOGY | Facility: CLINIC | Age: 80
End: 2025-02-26
Payer: MEDICARE

## 2025-02-26 ENCOUNTER — NON-APPOINTMENT (OUTPATIENT)
Age: 80
End: 2025-02-26

## 2025-02-26 PROCEDURE — 92250 FUNDUS PHOTOGRAPHY W/I&R: CPT

## 2025-02-26 PROCEDURE — 92014 COMPRE OPH EXAM EST PT 1/>: CPT

## 2025-05-06 ENCOUNTER — APPOINTMENT (OUTPATIENT)
Dept: OPHTHALMOLOGY | Facility: CLINIC | Age: 80
End: 2025-05-06

## 2025-06-18 NOTE — ED PROVIDER NOTE - NS ED MD DISPO DISCHARGE CCDA
Patient notified and verbalized understanding to results. Orders pending, please advise.    Patient/Caregiver provided printed discharge information.

## 2025-07-15 ENCOUNTER — NON-APPOINTMENT (OUTPATIENT)
Age: 80
End: 2025-07-15

## 2025-07-15 ENCOUNTER — APPOINTMENT (OUTPATIENT)
Dept: OPHTHALMOLOGY | Facility: CLINIC | Age: 80
End: 2025-07-15
Payer: MEDICARE

## 2025-07-15 PROCEDURE — 92250 FUNDUS PHOTOGRAPHY W/I&R: CPT

## 2025-07-15 PROCEDURE — 92014 COMPRE OPH EXAM EST PT 1/>: CPT
